# Patient Record
Sex: FEMALE | Race: WHITE | NOT HISPANIC OR LATINO
[De-identification: names, ages, dates, MRNs, and addresses within clinical notes are randomized per-mention and may not be internally consistent; named-entity substitution may affect disease eponyms.]

---

## 2023-12-13 ENCOUNTER — TRANSCRIPTION ENCOUNTER (OUTPATIENT)
Age: 48
End: 2023-12-13

## 2023-12-13 ENCOUNTER — APPOINTMENT (OUTPATIENT)
Dept: ORTHOPEDIC SURGERY | Facility: CLINIC | Age: 48
End: 2023-12-13
Payer: COMMERCIAL

## 2023-12-13 DIAGNOSIS — M20.11 HALLUX VALGUS (ACQUIRED), RIGHT FOOT: ICD-10-CM

## 2023-12-13 DIAGNOSIS — M20.12 HALLUX VALGUS (ACQUIRED), LEFT FOOT: ICD-10-CM

## 2023-12-13 DIAGNOSIS — M65.9 SYNOVITIS AND TENOSYNOVITIS, UNSPECIFIED: ICD-10-CM

## 2023-12-13 DIAGNOSIS — Z00.00 ENCOUNTER FOR GENERAL ADULT MEDICAL EXAMINATION W/OUT ABNORMAL FINDINGS: ICD-10-CM

## 2023-12-13 PROCEDURE — 73630 X-RAY EXAM OF FOOT: CPT | Mod: 50

## 2023-12-13 PROCEDURE — 99203 OFFICE O/P NEW LOW 30 MIN: CPT | Mod: 25

## 2023-12-13 RX ORDER — MELOXICAM 15 MG/1
15 TABLET ORAL DAILY
Qty: 30 | Refills: 2 | Status: COMPLETED | COMMUNITY
Start: 2023-12-13 | End: 2024-03-12

## 2023-12-13 NOTE — PHYSICAL EXAM
[Left] : left foot and ankle [2nd] : 2nd [NL (40)] : plantar flexion 40 degrees [NL 30)] : inversion 30 degrees [NL (20)] : eversion 20 degrees [5___] : [facility] 5[unfilled]/5 [2+] : posterior tibialis pulse: 2+ [Normal] : saphenous nerve sensation normal [] : patient ambulates without assistive device [Bilateral] : foot bilaterally [Weight -] : weightbearing [FreeTextEntry9] : hypermobility of 1st ray [de-identified] : Bilateral hallux valgus HVI and evidence of simple bunionectomy on left.  Lateral deviation of lesser toes (worst at 2nd  on left).

## 2023-12-13 NOTE — DISCUSSION/SUMMARY
[de-identified] : Patient was explained that the 2nd toe pain is from her bunion. Shoe modifications recommended. Budin splint provided. Meloxciam prescribed.  The patient was explained the options as well as benefits of over the counter verses prescription strength nonsteroidal anti-inflammatory medication. The patient opts for a prescription strength medication.

## 2023-12-13 NOTE — HISTORY OF PRESENT ILLNESS
[8] : 8 [0] : 0 [Throbbing] : throbbing [de-identified] : Pt is a 48 year old female who presents today for evaluation of both feet. She reports bilateral bunion pain. She did have "noninvasive bunion shaving" of both feet [id number].  She reports no pain on the right, but has been having increasing pain on the left 2nd toe base since September, 2023.  No trauma.  No treatment to date. No numbness/tingling. WB in heels. She reports no bunion pain. [] : no [FreeTextEntry1] : b/l Foot

## 2024-01-10 ENCOUNTER — APPOINTMENT (OUTPATIENT)
Dept: ORTHOPEDIC SURGERY | Facility: CLINIC | Age: 49
End: 2024-01-10

## 2024-02-05 ENCOUNTER — APPOINTMENT (OUTPATIENT)
Dept: ORTHOPEDIC SURGERY | Facility: CLINIC | Age: 49
End: 2024-02-05
Payer: COMMERCIAL

## 2024-02-05 VITALS — BODY MASS INDEX: 21.66 KG/M2 | WEIGHT: 130 LBS | HEIGHT: 65 IN

## 2024-02-05 DIAGNOSIS — M23.91 UNSPECIFIED INTERNAL DERANGEMENT OF RIGHT KNEE: ICD-10-CM

## 2024-02-05 DIAGNOSIS — S80.01XA CONTUSION OF RIGHT KNEE, INITIAL ENCOUNTER: ICD-10-CM

## 2024-02-05 DIAGNOSIS — M79.18 MYALGIA, OTHER SITE: ICD-10-CM

## 2024-02-05 PROCEDURE — 73564 X-RAY EXAM KNEE 4 OR MORE: CPT | Mod: RT

## 2024-02-05 RX ORDER — NAPROXEN 500 MG/1
500 TABLET ORAL TWICE DAILY
Qty: 60 | Refills: 0 | Status: ACTIVE | COMMUNITY
Start: 2024-02-05 | End: 1900-01-01

## 2024-02-05 NOTE — IMAGING
[de-identified] :  RIGHT KNEE Inspection:  minimal effusion Palpation: medial facet of the patella tenderness  Knee Range of Motion:  0-135 Strength: 5/5 Quadriceps strength, 5/5 Hamstring strength, 4/5 Hip Abductor strength Neurological: light touch is intact throughout Ligament Stability and Special Tests:  McMurrays: neg Lachman: neg Pivot Shift: neg Posterior Drawer: neg Valgus: neg Varus: neg Patella Apprehension: neg Patella Maltracking: neg

## 2024-02-05 NOTE — HISTORY OF PRESENT ILLNESS
[de-identified] : 48 year old female  (  divorce, [place] ) right knee pain since 2/4/24 tripped over dog and landed on knee    The pain is located anterior, deep  The pain is associated with swelling Worse with activity and better at rest. Has tried ice, Advil

## 2024-02-05 NOTE — ASSESSMENT
[FreeTextEntry1] : Right X-Ray Examination of the KNEE (4 views): there are no fractures, subluxations or dislocations.  - We discussed their diagnosis and treatment options at length including the risks and benefits of both surgical and non-surgical options. - We will continue conservative treatment with icing, anti-inflammatory medication, and PT  - The patient was provided with a prescription to work on hip ER/abductors strengthening along with quad/hamstring stretches and strengthening  - The patient was advised to let pain guide the gradual advancement of activities.  - napryn rx - Patient was given a prescription for an anti-inflammatory medication.  They will take it for the next week and then on an as needed basis, as long as there are no medical contra-indications.  Patient is counseled on possible GI, renal, and cardiovascular side effects. - Follow up as needed in 6 weeks to re-evaluate.

## 2024-02-27 ENCOUNTER — EMERGENCY (EMERGENCY)
Facility: HOSPITAL | Age: 49
LOS: 1 days | Discharge: HOME | End: 2024-02-27
Attending: STUDENT IN AN ORGANIZED HEALTH CARE EDUCATION/TRAINING PROGRAM | Admitting: STUDENT IN AN ORGANIZED HEALTH CARE EDUCATION/TRAINING PROGRAM
Payer: COMMERCIAL

## 2024-02-27 VITALS
DIASTOLIC BLOOD PRESSURE: 61 MMHG | WEIGHT: 134.92 LBS | RESPIRATION RATE: 18 BRPM | HEIGHT: 65 IN | SYSTOLIC BLOOD PRESSURE: 113 MMHG | OXYGEN SATURATION: 99 % | TEMPERATURE: 99 F | HEART RATE: 89 BPM

## 2024-02-27 PROCEDURE — 27786 TREATMENT OF ANKLE FRACTURE: CPT | Mod: 54,LT

## 2024-02-27 PROCEDURE — 99285 EMERGENCY DEPT VISIT HI MDM: CPT | Mod: 57

## 2024-02-27 NOTE — ED ADULT TRIAGE NOTE - CHIEF COMPLAINT QUOTE
c/o R ankle swelling. pt state she just came back from a trip and may have twisted her ankle 1 week ago. Pt observed with R ankle swelling, bruising. Denies numbness/tingling. Denies fevers/chills. Unable to put weight on R foot. Did not seek medical attention on vacation. No medicine allergies. Pt took 600 mg Ibuprofen at 10AM.

## 2024-02-28 ENCOUNTER — APPOINTMENT (OUTPATIENT)
Dept: ORTHOPEDIC SURGERY | Facility: CLINIC | Age: 49
End: 2024-02-28
Payer: COMMERCIAL

## 2024-02-28 DIAGNOSIS — S82.65XA NONDISPLACED FRACTURE OF LATERAL MALLEOLUS OF LEFT FIBULA, INITIAL ENCOUNTER FOR CLOSED FRACTURE: ICD-10-CM

## 2024-02-28 PROCEDURE — 73610 X-RAY EXAM OF ANKLE: CPT

## 2024-02-28 PROCEDURE — 27786 TREATMENT OF ANKLE FRACTURE: CPT

## 2024-02-28 PROCEDURE — 99284 EMERGENCY DEPT VISIT MOD MDM: CPT | Mod: 25

## 2024-02-28 PROCEDURE — 73610 X-RAY EXAM OF ANKLE: CPT | Mod: 26,LT

## 2024-02-28 PROCEDURE — 73630 X-RAY EXAM OF FOOT: CPT | Mod: 26,LT

## 2024-02-28 PROCEDURE — 99213 OFFICE O/P EST LOW 20 MIN: CPT | Mod: 25

## 2024-02-28 PROCEDURE — 99212 OFFICE O/P EST SF 10 MIN: CPT | Mod: 57

## 2024-02-28 PROCEDURE — 73630 X-RAY EXAM OF FOOT: CPT

## 2024-02-28 PROCEDURE — 29515 APPLICATION SHORT LEG SPLINT: CPT | Mod: RT

## 2024-02-28 RX ORDER — IBUPROFEN 200 MG
600 TABLET ORAL ONCE
Refills: 0 | Status: COMPLETED | OUTPATIENT
Start: 2024-02-28 | End: 2024-02-28

## 2024-02-28 RX ADMIN — Medication 600 MILLIGRAM(S): at 01:33

## 2024-02-28 NOTE — ED PROVIDER NOTE - DIFFERENTIAL DIAGNOSIS
Differential Diagnosis Ddx includes but not limited to fracture, dislocation, subluxation, sprain, tendon injury, ligament injury, DVT

## 2024-02-28 NOTE — ED PROVIDER NOTE - PROGRESS NOTE DETAILS
Results of x-ray d/w patient.  Splint applied, crutches given.  Patient advised strict non-weight bearing, ice, elevation.  F/u with ortho this week.  Patient declining Percocet, states she will take Motrin.   at bedside to take patient home.

## 2024-02-28 NOTE — ED PROVIDER NOTE - OBJECTIVE STATEMENT
48-year-old female with no significant PMH presents with left ankle pain and swelling x 1 week.  Patient states she was on vacation out of the country and tripped over a step while wearing heels.  Her left ankle rolled and she felt immediate pain.  She was evaluated by the [facility] physician that night.  Since the injury, she has been taking Motrin, elevating her left leg, and applying ice to her left foot and ankle.  She feels the pain is worsening and she is now having difficulty weightbearing.  Patient returned back to [place] today and her  drove her to the emergency room.  In addition patient reports left lower extremity swelling but suspects it is from the airplane ride. She denies any other injuries. No PMD

## 2024-02-28 NOTE — ED PROVIDER NOTE - MUSCULOSKELETAL, MLM
Spine appears normal, range of motion is not limited.  Left foot with diffuse swelling along dorsal surface and LLE.  Ecchymosis along left heel.  Lateral malleolus with significant swelling and TTP.  Normal pulses and sensation, cap refill < 2 seconds

## 2024-02-28 NOTE — ED PROVIDER NOTE - PATIENT PORTAL LINK FT
You can access the FollowMyHealth Patient Portal offered by [facility] by registering at the following website: [email or web address]. By joining [email or web address]’s FollowMyHealth portal, you will also be able to view your health information using other applications (apps) compatible with our system.

## 2024-02-28 NOTE — ED PROVIDER NOTE - CLINICAL SUMMARY MEDICAL DECISION MAKING FREE TEXT BOX
48 year old female p/w left ankle pain and swelling s/o fall 1 week ago while on vacation.  Significant edema, tenderness, and ecchymosis to left ankle, foot, and heel.  X-ray, LE doppler, ortho f/u

## 2024-02-28 NOTE — ED ADULT NURSE NOTE - NSFALLRISKINTERV_ED_ALL_ED

## 2024-02-28 NOTE — HISTORY OF PRESENT ILLNESS
[8] : 8 [2] : 2 [Shooting] : shooting [de-identified] : Pt. is a 48 year old female presenting for evaluation of her left ankle. She was away in [place] [facility] and fell 2/20/24. No formal treatment until return home on 2/27/24 when she was evaluated at [place] [facility] and diagnosed with lateral malleolus fracture. History of ankle sprains. NSAIDS prn. Ice to affected area. NWB in wheelchair and splint.  [FreeTextEntry1] : left ankle [] : no

## 2024-02-28 NOTE — ED PROVIDER NOTE - NSFOLLOWUPINSTRUCTIONS_ED_ALL_ED_FT
Please follow up with orthopedics this week.  Take Motrin or Tylenol for pain.  Return to the ER for persistent leg pain, swelling, fever, numbness, gait disturbance, or any other concerns.     Nondisplaced Fibular Ankle Fracture Treated With Immobilization       A nondisplaced fibular ankle fracture is a simple break of the bottom of the fibula. The fibula is a bone in the lower leg, between the knee and the foot. In a nondisplaced fracture, the pieces of the broken bone line up with each other and are not out of place. This condition usually does not need surgery and can be treated with a splint or cast.    What are the causes?  This condition may be caused by:    A hard, direct hit or injury to the side of the leg.  A powerful twisting or rotating movement.  Rolling the ankle.  Falling or tripping.    What increases the risk?  You are more likely to develop this condition if:    You play sports that involve a lot of running and pivoting, such as basketball.  You play impact sports, such as football or soccer.  You smoke.  You have diabetes.  You have a history of ankle fractures.  You are obese.    What are the signs or symptoms?  Symptoms of this condition include:    Severe pain that begins immediately after the injury.  Bruising.  Swelling.  Inability to put weight on the injured ankle.  An ankle that is tender to the touch.    How is this diagnosed?  This condition is diagnosed based on:    Your medical history.  A physical exam.  Imaging tests to confirm the fracture and to check the extent of the injury. These tests may include:    X-rays.  Stress X-ray. During this test, your health care provider will put pressure on your ankle while taking an X-ray. This will help to determine whether your ankle is stable.  CT scan.  MRI.    How is this treated?  This condition may be treated with:    A splint.  Icing and raising (elevating) the ankle.  A cast.  A removable cast or walking boot.  Crutches. These may be needed to help you walk.    Follow these instructions at home:      If you have a splint, removable cast, or boot:    Wear the splint, cast, or boot as told by your health care provider. Remove it only as told by your health care provider.   Loosen it if your toes tingle, become numb, or turn cold and blue.  Keep it clean and dry.        If you have a non-removable cast:    Do not put pressure on any part of the cast until it is fully hardened. This may take several hours.  Check the skin around the cast every day. Tell your health care provider about any concerns.  Do not stick anything inside the cast to scratch your skin. Doing that increases your risk of infection.  You may put lotion on dry skin around the edges of the cast. Do not put lotion on the skin underneath it.   Do not break off edges or trim your cast.  Keep it clean and dry.        Bathing    Do not take baths, swim, or use a hot tub until your health care provider approves. Ask your health care provider if you may take showers. You may only be allowed to take sponge baths.  If the splint, cast, or walking boot is not waterproof:    Do not let it get wet.  Cover it with a watertight covering when you take a bath or shower.        Managing pain, stiffness, and swelling     If directed, put ice on the injured area. To do this:    If you have a removable splint, cast, or boot, remove it as told by your health care provider.  Put ice in a plastic bag.  Place a towel between your skin and the bag, or between your cast and the bag.  Leave the ice on for 20 minutes, 2–3 times a day.  Move your toes often to reduce stiffness and swelling.  Raise (elevate) the injured area above the level of your heart while you are sitting or lying down.        Activity    Do not use the injured leg to support your body weight until your health care provider says that you can. Use crutches as told by your health care provider.  Resume walking without crutches as directed by your health care provider.  Do exercises and stretches as told by your health care provider.        Driving    Ask your health care provider when it is safe to drive if you have a splint, cast, or boot on your ankle.  Ask your health care provider if the medicine prescribed to you requires you to avoid driving or using machinery.         General instructions     Take over-the-counter and prescription medicines only as told by your health care provider.  Do not use any products that contain nicotine or tobacco, such as cigarettes, e-cigarettes, and chewing tobacco. These can delay bone healing. If you need help quitting, ask your health care provider.  Keep all follow-up visits as told by your health care provider. This is important.    Contact a health care provider if:  Your cast gets damaged or it breaks.  Your pain does not get better with medicine.    Get help right away if:  You develop severe pain or more swelling in your ankle or foot that cannot be controlled with medicines.  Your skin or nails below the injury turn blue or gray, feel cold, or become numb.  The skin under your cast burns or stings.  There is a bad smell or pus coming from under the cast.  You cannot move your toes.    Summary  A nondisplaced fibular ankle fracture is a simple break of the bottom of a bone in the lower leg (fibula).  This condition may be treated with a splint, icing and elevation, a cast, or a removable cast or walking boot. You may also need crutches to help you walk while your ankle heals.  To help manage pain, stiffness, and swelling, put ice on the injured area as directed by your health care provider.  You should not use the injured leg to support your body weight until your health care provider says that you can. Use crutches as told by your health care provider.    ADDITIONAL NOTES AND INSTRUCTIONS    Please follow up with your Primary MD in 24-48 hr.  Seek immediate medical care for any new/worsening signs or symptoms. Nondisplaced Fibular Ankle Fracture Treated With Immobilization

## 2024-02-28 NOTE — PHYSICAL EXAM
[Left] : left foot and ankle [2+] : posterior tibialis pulse: 2+ [Normal] : saphenous nerve sensation normal [Moderate] : moderate swelling of lateral ankle [] : no pain when stressing lateral tarsal metatarsal joint [de-identified] : Not assessed. [de-identified] : plantar flexion 30 degrees [de-identified] : inversion 5 degrees [de-identified] : eversion 5 degrees [TWNoteComboBox7] : dorsiflexion 10 degrees

## 2024-02-28 NOTE — DATA REVIEWED
[Outside X-rays] : outside x-rays [Left] : left [Ankle] : ankle [Report was reviewed and noted in the chart] : The report was reviewed and noted in the chart [I reviewed the films/CD] : I reviewed the films/CD [FreeTextEntry1] : [facility]: Nondisplaced fracture lateral malleolus, mortise intact.

## 2024-02-28 NOTE — ED PROVIDER NOTE - CARE PROVIDER_API CALL
Elvis Starr  Orthopaedic Surgery  [address] [place] [address]  Phone: [phone]  Fax: [phone]  Follow Up Time:

## 2024-02-28 NOTE — ED ADULT NURSE NOTE - BREATH SOUNDS, MLM
From: Jani Guaman  To: Dr. Bessie Canavan  Sent: 11/9/2021 6:42 AM CST  Subject: Prescription Question    I need refills on Estradiol 2 MG qd sent to [facility]  13. please. Clear

## 2024-02-28 NOTE — ED ADULT NURSE NOTE - OBJECTIVE STATEMENT
pt a/o x 4 with a calm affect c/o right ankle pain after injuring it on vacation a week prior.  patient states she has been in a wheel chair and staying off of it. swelling and bruising to extremity

## 2024-02-28 NOTE — DISCUSSION/SUMMARY
[de-identified] : Recommend weight bearing as tolerated in cam walker.  Ice to the affected area as needed.  Nsaids prn. Elevation encouraged.   Patient was instructed that they can not operate an automatic vehicle while wearing a CAM boot or cast on the right lower extremity. If operating a vehicle that requires use of a clutch, patient may not drive while wearing a CAM boot or cast on the left lower extremity.  Even-up discussed.   Repeat x-ray will be performed at the next office visit.
3 = A little assistance

## 2024-03-13 ENCOUNTER — APPOINTMENT (OUTPATIENT)
Dept: ORTHOPEDIC SURGERY | Facility: CLINIC | Age: 49
End: 2024-03-13
Payer: COMMERCIAL

## 2024-03-13 PROBLEM — Z78.9 OTHER SPECIFIED HEALTH STATUS: Chronic | Status: ACTIVE | Noted: 2024-02-28

## 2024-03-13 PROCEDURE — 73610 X-RAY EXAM OF ANKLE: CPT | Mod: LT

## 2024-03-13 PROCEDURE — 99024 POSTOP FOLLOW-UP VISIT: CPT

## 2024-03-13 NOTE — PHYSICAL EXAM
[Left] : left foot and ankle [2+] : posterior tibialis pulse: 2+ [Normal] : saphenous nerve sensation normal [4___] : eversion 4[unfilled]/5 [] : no pain when stressing lateral tarsal metatarsal joint [de-identified] : Not assessed. [de-identified] : plantar flexion 30 degrees [de-identified] : WB in cam boot [de-identified] : inversion 10 degrees [de-identified] : eversion 10 degrees [TWNoteComboBox7] : dorsiflexion 10 degrees

## 2024-03-13 NOTE — HISTORY OF PRESENT ILLNESS
[8] : 8 [2] : 2 [Shooting] : shooting [de-identified] : Patent returns for her left lateral malleolus fracture from 2/20/24. She has been wb in a cam boot and reports doing well.  No real pain when ambulating.  She is seeing Dr. Coley for her right knee. [] : no [FreeTextEntry1] : left ankle

## 2024-03-13 NOTE — DISCUSSION/SUMMARY
[de-identified] : Patient is doing well.  She will continue to be wb in her cam boot. Ice/nsaids prn.   Repeat x-ray will be performed at the next office visit.

## 2024-04-03 ENCOUNTER — APPOINTMENT (OUTPATIENT)
Dept: ORTHOPEDIC SURGERY | Facility: CLINIC | Age: 49
End: 2024-04-03
Payer: COMMERCIAL

## 2024-04-03 PROCEDURE — 99024 POSTOP FOLLOW-UP VISIT: CPT

## 2024-04-03 PROCEDURE — 73610 X-RAY EXAM OF ANKLE: CPT | Mod: LT

## 2024-04-03 NOTE — HISTORY OF PRESENT ILLNESS
[2] : 2 [Shooting] : shooting [de-identified] : Patent returns for her left lateral malleolus fracture from 2/20/24. She has been wb in a cam boot and reports improvement.   [] : no [FreeTextEntry1] : left ankle

## 2024-04-03 NOTE — DISCUSSION/SUMMARY
[de-identified] : Patient continues to do well. She can now be wb in a sneaker with an airsport brace that she will obtain on her own. Home ROM exercises. Ice/nsaids prn.   Repeat x-ray will be performed at the next office visit.

## 2024-04-03 NOTE — PHYSICAL EXAM
[4___] : eversion 4[unfilled]/5 [2+] : posterior tibialis pulse: 2+ [Normal] : saphenous nerve sensation normal [5___] : dorsiflexion 5[unfilled]/5 [] : no pain when stressing lateral tarsal metatarsal joint [Left] : left ankle [Weight -] : weightbearing [Lateral malleolus fracture] : Lateral malleolus fracture [The fracture is in acceptable alignment. There is progression in healing seen] : The fracture is in acceptable alignment. There is progression in healing seen [FreeTextEntry8] : Mild tenderness at lateral malleolus. [de-identified] : Not assessed. [TWNoteComboBox7] : dorsiflexion 10 degrees [de-identified] : WB in cam boot [de-identified] : plantar flexion 30 degrees [de-identified] : inversion 20 degrees [de-identified] : eversion 15 degrees

## 2024-05-01 ENCOUNTER — APPOINTMENT (OUTPATIENT)
Dept: ORTHOPEDIC SURGERY | Facility: CLINIC | Age: 49
End: 2024-05-01
Payer: COMMERCIAL

## 2024-05-01 PROCEDURE — 99024 POSTOP FOLLOW-UP VISIT: CPT

## 2024-05-01 PROCEDURE — 73610 X-RAY EXAM OF ANKLE: CPT | Mod: LT

## 2024-05-01 NOTE — DISCUSSION/SUMMARY
[de-identified] : Patient continues to improve. She no longer needs the airsport brace. She will start formal PT. Home ROM exercises. Ice/nsaids prn.  No high impact activities.  Repeat x-ray will be performed at the next office visit.

## 2024-05-01 NOTE — PHYSICAL EXAM
[2+] : posterior tibialis pulse: 2+ [Normal] : saphenous nerve sensation normal [Left] : left ankle [Weight -] : weightbearing [The fracture is in acceptable alignment. There is progression in healing seen] : The fracture is in acceptable alignment. There is progression in healing seen [Lateral malleolus fracture] : Lateral malleolus fracture [5___] : eversion 5[unfilled]/5 [] : non-antalgic [de-identified] : WB in cam boot [FreeTextEntry9] : Fracture line fading compared to prior xray.  Mortise intact. [TWNoteComboBox7] : dorsiflexion 10 degrees [de-identified] : plantar flexion 30 degrees [de-identified] : inversion 20 degrees [de-identified] : eversion 15 degrees

## 2024-05-01 NOTE — HISTORY OF PRESENT ILLNESS
[1] : 2 [Shooting] : shooting [de-identified] : Patent returns for her left lateral malleolus fracture from 2/20/24. She has been wb in a sneaker and an Airsport brace.  She reports doing much better.  No real pain at this time. [] : no [FreeTextEntry1] : left ankle

## 2024-05-29 ENCOUNTER — APPOINTMENT (OUTPATIENT)
Dept: ORTHOPEDIC SURGERY | Facility: CLINIC | Age: 49
End: 2024-05-29
Payer: COMMERCIAL

## 2024-05-29 DIAGNOSIS — R29.898 OTHER SYMPTOMS AND SIGNS INVOLVING THE MUSCULOSKELETAL SYSTEM: ICD-10-CM

## 2024-05-29 DIAGNOSIS — M25.672 STIFFNESS OF LEFT ANKLE, NOT ELSEWHERE CLASSIFIED: ICD-10-CM

## 2024-05-29 DIAGNOSIS — S82.65XD NONDISPLACED FRACTURE OF LATERAL MALLEOLUS OF LEFT FIBULA, SUBSEQUENT ENCOUNTER FOR CLOSED FRACTURE WITH ROUTINE HEALING: ICD-10-CM

## 2024-05-29 PROCEDURE — 99213 OFFICE O/P EST LOW 20 MIN: CPT

## 2024-05-29 PROCEDURE — 73610 X-RAY EXAM OF ANKLE: CPT | Mod: LT

## 2024-05-29 NOTE — DISCUSSION/SUMMARY
[de-identified] : Patient continues to improve in terms of fracture healing. PT was again strongly recommended. Continue with home ROM exercises. Ice/nsaids prn. No high impact activities.  Repeat x-ray will be performed at the next office visit.

## 2024-05-29 NOTE — HISTORY OF PRESENT ILLNESS
[1] : 2 [Shooting] : shooting [de-identified] : Patent returns for her left lateral malleolus fracture from 2/20/24. She has been wb in a sneakers and has been doing exercises at home ([facility]).  She reports no real pain at this time, but has intermittent swelling. [] : no [FreeTextEntry1] : left ankle

## 2024-05-29 NOTE — PHYSICAL EXAM
[2+] : posterior tibialis pulse: 2+ [Normal] : saphenous nerve sensation normal [Left] : left ankle [Weight -] : weightbearing [The fracture is in acceptable alignment. There is progression in healing seen] : The fracture is in acceptable alignment. There is progression in healing seen [Lateral malleolus fracture] : Lateral malleolus fracture [4___] : eversion 4[unfilled]/5 [] : patient ambulates without assistive device [FreeTextEntry9] : Fracture line continues to fade.  It is less prominent compared to prior xray.  Mortise intact. [TWNoteComboBox7] : dorsiflexion 10 degrees [de-identified] : plantar flexion 30 degrees [de-identified] : inversion 20 degrees [de-identified] : eversion 15 degrees

## 2024-07-17 ENCOUNTER — APPOINTMENT (OUTPATIENT)
Dept: ORTHOPEDIC SURGERY | Facility: CLINIC | Age: 49
End: 2024-07-17
